# Patient Record
Sex: MALE | Race: WHITE
[De-identification: names, ages, dates, MRNs, and addresses within clinical notes are randomized per-mention and may not be internally consistent; named-entity substitution may affect disease eponyms.]

---

## 2018-01-12 ENCOUNTER — HOSPITAL ENCOUNTER (EMERGENCY)
Dept: HOSPITAL 31 - C.ER | Age: 53
Discharge: HOME | End: 2018-01-12
Payer: COMMERCIAL

## 2018-01-12 VITALS
SYSTOLIC BLOOD PRESSURE: 177 MMHG | DIASTOLIC BLOOD PRESSURE: 89 MMHG | OXYGEN SATURATION: 97 % | HEART RATE: 74 BPM | TEMPERATURE: 98.4 F | RESPIRATION RATE: 16 BRPM

## 2018-01-12 DIAGNOSIS — Z23: ICD-10-CM

## 2018-01-12 DIAGNOSIS — S60.452A: Primary | ICD-10-CM
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
## 2018-01-12 NOTE — C.PDOC
History Of Present Illness


53 yo male c/o fishing hook stuck in R ring finger just prior to arrival. Pt 

notes he was organizing his fishing equipment and accidentally got stuck. Notes 

he tried removing it without success.  


Time Seen by Provider: 01/12/18 17:35


Chief Complaint (Nursing): Foreign Body


History Per: Patient


History/Exam Limitations: no limitations


Onset/Duration Of Symptoms: Hrs


Current Symptoms Are (Timing): Still Present





Past Medical History


Vital Signs: 


 Last Vital Signs











Temp  98.4 F   01/12/18 17:24


 


Pulse  74   01/12/18 17:24


 


Resp  16   01/12/18 17:24


 


BP  177/89 H  01/12/18 17:24


 


Pulse Ox  97   01/12/18 19:40











Family History: States: Unknown Family Hx





- Social History


Hx Alcohol Use: No


Hx Substance Use: No





- Immunization History


Hx Tetanus Toxoid Vaccination: No


Hx Influenza Vaccination: Yes (2017)


Hx Pneumococcal Vaccination: No





Review Of Systems


Constitutional: Negative for: Fever


Neurological: Negative for: Weakness, Numbness





Physical Exam





- Physical Exam


Appears: Well, Non-toxic, No Acute Distress


Skin: Warm, Dry, Other ((+) FB (fish hook) puctured in R ring finger)


Head: Atraumatic, Normacephalic


Eye(s): bilateral: Normal Inspection, EOMI


Nose: Normal


Oral Mucosa: Moist


Neck: Normal, Normal ROM, Supple


Chest: Symmetrical


Respiratory: No Accessory Muscle Use


Gastrointestinal/Abdominal: Normal Exam


Back: Normal Inspection


Extremity: Normal ROM, Capillary Refill (< 2 sec)


Pulses: Left Radial: Normal, Right Radial: Normal


Neurological/Psych: Oriented x3, Normal Speech, Normal Motor, Normal Sensation





ED Course And Treatment


O2 Sat by Pulse Oximetry: 97





- Other Rad


  ** Finger XR


X-Ray: Interpreted by Me, Viewed By Me


Interpretation: (+) FB


Progress Note: Discussed wound care and wound check in two days.  PT refused 

pain medication. Tetanus given.





Laceration





- Laceration Repair


  ** R 4th finger


Wound Length (In cm): puncture


Description Of Wound: Irregular


Wound Cleansed With: Betadine, Sterile Saline


Anesthesia: Lidocaine 1%


Wound Examination: Irrigated With Saline





Disposition





- Disposition


Disposition: HOME/ ROUTINE


Disposition Time: 19:37


Condition: STABLE


Additional Instructions: 


Follow up with your primary medical doctor or clinic in 2-5 days for further 

evaluation. Take medications as prescribed. Return to the emergency department 

at any time if symptoms persist or worsen.


Prescriptions: 


Clindamycin [Cleocin] 300 mg PO Q6 #28 cap


Mupirocin 2% Ointment [Bactroban Ointment] 1 appl TP TID #1 tube


Instructions:  Soft Tissue Foreign Body (ED)


Forms:  [facility] (English)





- Clinical Impression


Clinical Impression: 


 Foreign body (FB) in soft tissue

## 2018-01-13 NOTE — RAD
PROCEDURE:  Right ring finger radiographs.



HISTORY:

fb



COMPARISON:

None available.



TECHNIQUE:

AP radiograph of the right hand, as well as spot oblique and lateral 

images of ring finger were obtained.



FINDINGS:



RIGHT RING FINGER:

No acute displaced fracture identified. Nonspecific 6 mm sclerotic 

focus, distal 4th metacarpal. Remainder of the right hand (as seen on 

the AP view) grossly unremarkable.



JOINTS:

No dislocation. 



SOFT TISSUES:

Radiopaque foreign body noted at the level of the distal 4th phalanx 

soft tissues. Soft tissue swelling. 



OTHER FINDINGS:

None.



IMPRESSION:

Radiopaque foreign body noted at the level of the distal 4th phalanx 

soft tissues.  Soft tissue swelling.